# Patient Record
Sex: MALE | NOT HISPANIC OR LATINO | URBAN - METROPOLITAN AREA
[De-identification: names, ages, dates, MRNs, and addresses within clinical notes are randomized per-mention and may not be internally consistent; named-entity substitution may affect disease eponyms.]

---

## 2018-04-26 NOTE — PROCEDURE NOTE: CLINICAL
PROCEDURE NOTE: Dilation of Lacrimal Punctum, With or Without Irrigation #1 Right Lower Lid. Prior to treatment, the risks/benefits/alternatives were discussed. The patient wished to proceed with procedure. The punctum was dilated with a punctum dilator. A 1cc syringe and cannula were used to irrigate saline in to the canaliculus. Patient tolerated procedure well. There were no complications. Post op instructions given. Sabino Tierney

## 2019-01-24 ENCOUNTER — IMPORTED ENCOUNTER (OUTPATIENT)
Dept: URBAN - METROPOLITAN AREA CLINIC 1 | Facility: CLINIC | Age: 64
End: 2019-01-24

## 2019-01-24 PROBLEM — H25.813: Noted: 2019-01-24

## 2019-01-24 PROBLEM — H00.031: Noted: 2019-01-24

## 2019-01-24 PROBLEM — H18.413: Noted: 2019-01-24

## 2019-01-24 PROCEDURE — 92002 INTRM OPH EXAM NEW PATIENT: CPT

## 2019-01-24 NOTE — PATIENT DISCUSSION
1.  Chalazion Abscessed RUL--Patient was compliant with hot compresses without resolution. Discussed option for Incision and Drainage. Risks and Benefits discussed with patient. Patient stated complete understanding and would like to proceed with procedure. PMG examined today with RBF advised to RTC tomorrow morning for I&D. Will hold on refilling antibiotic patient understands. 2.  Cataract OU: Observe for now without intervention. The patient was advised to contact us if any change or worsening of vision3. Tarah Rehman for an appointment in tomorrow morning I&D RUL with Dr. Kurt Painter.

## 2019-01-25 ENCOUNTER — IMPORTED ENCOUNTER (OUTPATIENT)
Dept: URBAN - METROPOLITAN AREA CLINIC 1 | Facility: CLINIC | Age: 64
End: 2019-01-25

## 2019-01-25 PROCEDURE — 67700 BLEPHAROTOMY DRG ABSC EYELID: CPT

## 2019-01-25 NOTE — PATIENT DISCUSSION
Incision and drainage of abscessed chalazion on right upper eyelid: After proper The patient was taken to the operating room and placed supine on the operating table. The right upper eye lid was prepped and draped in the standard surgical fashion. Xylocaine 1% and Epinephrine was infiltrated around the chalaziion. A chalazion speculum was then placed and the eyelid was everted. A cross incision was made through the chalazion and immediate release of the lipogranulomatous material was expressed. A curette was used to further evacuate the chalazion cavity. The speculum was removed Maxitrol ointment was applied no pressure patch applied. Instructed patient to clean the surgical site with soap or peroxide till the area has closed. The patient tolerated the procedure well and there were no complications. F/u PRN.

## 2019-08-20 ENCOUNTER — IMPORTED ENCOUNTER (OUTPATIENT)
Dept: URBAN - METROPOLITAN AREA CLINIC 1 | Facility: CLINIC | Age: 64
End: 2019-08-20

## 2019-08-20 PROBLEM — H10.023: Noted: 2019-08-20

## 2019-08-20 PROBLEM — H00.031: Noted: 2019-08-20

## 2019-08-20 PROCEDURE — 92012 INTRM OPH EXAM EST PATIENT: CPT

## 2019-08-20 NOTE — PATIENT DISCUSSION
1.  Chalazion abscessed right upper eyelid -- Begin Keflex 250 mg PO QID x 1 week #28 (erx'd). Discussed with patient to use Hot compresses TID x 5 minutes. (Stressed temperature of compress to be over 100°) If without improvement discussed with patient possible Incision and Drainage procedure. Discussed Risks and benefits discussed with patient and patient states full understanding. 2. Bacterial Conjunctivitis OU -- Secondary to abscessed Chalazion. Begin Tobradex QID OU x 1 week/until seen (erx'd). The condition was discussed with the patient. The mechanism of transmission was explained. The patient was advised to wash his hands and use separate towels and bedding. Return for an appointment in 1 week for a 10 with Dr. Lucio Recinos.

## 2019-08-20 NOTE — PATIENT DISCUSSION
Bacterial Conjunctivitis OU --The condition was discussed with the patient. Topical antibiotic drops were prescribed. The mechanism of transmission was explained. The patient was advised to wash his hands and use separate towels and bedding.

## 2020-06-24 ENCOUNTER — IMPORTED ENCOUNTER (OUTPATIENT)
Dept: URBAN - METROPOLITAN AREA CLINIC 1 | Facility: CLINIC | Age: 65
End: 2020-06-24

## 2020-06-24 PROBLEM — H00.031: Noted: 2020-06-24

## 2020-06-24 PROBLEM — H11.31: Noted: 2020-06-24

## 2020-06-24 PROCEDURE — 92012 INTRM OPH EXAM EST PATIENT: CPT

## 2020-06-24 NOTE — PATIENT DISCUSSION
1.  Chalazion abscessed right upper eyelid -- Keflex 500mg PO BID x 10 days #20. Begin Hot compresses TID x 5 minutes for 3 weeks. If without improvement discussed with patient possible Incision and Drainage procedure. Risks and benefits discussed with patient and patient states full understanding. 2. Bacterial Conjunctivitis OU -- Secondary to abscessed Chalazion. Begin Tobrex (tobramycin) QID OD x 1 week/until seen (erx'd). The condition was discussed with the patient. The mechanism of transmission was explained. The patient was advised to wash his hands and use separate towels and bedding. 3. Subconjunctival hemorrhage OD -- Reassurance given. Condition discussed with patient. 4.  Cataract OU -- Observe . [facility]. 5.  Carlos Holt for an appointment in 2 weeks for a 10 with Dr. Lauren Matta.

## 2020-07-17 ENCOUNTER — IMPORTED ENCOUNTER (OUTPATIENT)
Dept: URBAN - METROPOLITAN AREA CLINIC 1 | Facility: CLINIC | Age: 65
End: 2020-07-17

## 2020-07-17 PROBLEM — H00.031: Noted: 2020-07-17

## 2020-07-17 PROBLEM — H11.31: Noted: 2020-07-17

## 2020-07-17 PROBLEM — H10.023: Noted: 2020-07-17

## 2020-07-17 PROCEDURE — 99213 OFFICE O/P EST LOW 20 MIN: CPT

## 2020-07-17 NOTE — PATIENT DISCUSSION
1.  Chalazion abscessed right upper eyelid - Resolved cont daily hot compresses 2. Subconjunctival hemorrhage OD -- Resolved 3. Bacterial Conjunctivitis OU -- Resolved DC Tobrex 4. Cataract OU 5. Belinda Levin for an appointment in PRN (patient is followed at the [place])  with Dr. Zay Saucedo.

## 2021-05-26 ENCOUNTER — IMPORTED ENCOUNTER (OUTPATIENT)
Dept: URBAN - METROPOLITAN AREA CLINIC 1 | Facility: CLINIC | Age: 66
End: 2021-05-26

## 2021-05-26 PROBLEM — H01.11: Noted: 2021-05-26

## 2021-05-26 PROBLEM — H04.123: Noted: 2021-05-26

## 2021-05-26 PROBLEM — H10.45: Noted: 2021-05-26

## 2021-05-26 PROBLEM — H16.143: Noted: 2021-05-26

## 2021-05-26 PROCEDURE — 99213 OFFICE O/P EST LOW 20 MIN: CPT

## 2021-05-26 NOTE — PATIENT DISCUSSION
1. Allergic Dermatitis UL - Recommend Cold Compresses TID 2. Allergic Conjunctivitis OS3. Dry Eyes with PEK OU4. Corneal Arcus OU5.  Cataracts OU

## 2021-05-26 NOTE — PATIENT DISCUSSION
Allergic Conjunctivitis OS -- The condition was  discussed with the patient. Avoidance of allergens and cool compresses were recommended.

## 2021-05-26 NOTE — PATIENT DISCUSSION
1. Allergic Dermatitis UL - Recommend Cold Compresses TID. Start Durezol Q2H OS while awake today then QID OS until sample gone (Sample given and instilled x1 in officde today) 2. Allergic Conjunctivitis OS - Recommend OTC Pataday QD OU PRN for itching. 3. Dry Eyes with PEK OU - Recommend ATs TID OU routinely. 4. Corneal Arcus OU5. Cataracts OU - observeReturn for an appointment in Friday 10 with Dr. Héctor Obando.

## 2021-05-28 ENCOUNTER — IMPORTED ENCOUNTER (OUTPATIENT)
Dept: URBAN - METROPOLITAN AREA CLINIC 1 | Facility: CLINIC | Age: 66
End: 2021-05-28

## 2021-05-28 PROBLEM — H00.14: Noted: 2021-05-28

## 2021-05-28 PROBLEM — L23.9: Noted: 2021-05-28

## 2021-05-28 PROCEDURE — 99213 OFFICE O/P EST LOW 20 MIN: CPT

## 2021-05-28 NOTE — PATIENT DISCUSSION
1.  (Possible) SARBJIT Chalazion -- Begin Keflex 500mg PO BID for 10 days (Erx'd). Begin hot compresses TID x 5 minutes. If without improvement discussed with patient possible Incision and Drainage procedure. Risks and benefits discussed with patient and patient states full understanding. 2. Allergic Dermatitis OS (SARBJIT & LLL) -- LL improving. Cont Durezol QID OS until gone. D/c cool compresses & begin hot compresses TID x 5 minutes. 3.  Allergic Conjunctivitis OS -- Cont OTC Pataday QD OU PRN itching. 4.  DESTINY w/ PEK OU -- Cont ATs TID OU routinely. 5.  Cataract OU -- Observe. 6.  Pinguecula OU -- Recommended wearing sunglasses when exposed to UV Light. 7.  Arcus OU Return for an appointment in 1 week 10 with Dr. Jackie Herman.

## 2021-06-07 ENCOUNTER — IMPORTED ENCOUNTER (OUTPATIENT)
Dept: URBAN - METROPOLITAN AREA CLINIC 1 | Facility: CLINIC | Age: 66
End: 2021-06-07

## 2021-06-07 PROBLEM — H00.16: Noted: 2021-06-07

## 2021-06-07 PROBLEM — L23.9: Noted: 2021-06-07

## 2021-06-07 PROBLEM — H00.14: Noted: 2021-06-07

## 2021-06-07 PROCEDURE — 99213 OFFICE O/P EST LOW 20 MIN: CPT

## 2021-06-07 NOTE — PATIENT DISCUSSION
1.  Resolving SARBJIT Chalazion -- Continue Keflex 500mg PO BID until gone. Continue hot compresses TID x 5 minutes x1 more week. If without improvement discussed with patient possible Incision and Drainage procedure. Risks and benefits discussed with patient and patient states full understanding. 2. Allergic Dermatitis OS (SARBJIT & LLL) -- improving. Decrease Durezol to BID OS until gone. 3.  Allergic Conjunctivitis OS -- Cont OTC Pataday QD OU PRN itching. 4.  DESTINY w/ PEK OU -- Cont ATs TID OU routinely. 5.  Cataract OU -- Observe. 6.  Pinguecula OD -- Recommended wearing sunglasses when exposed to UV Light. 7.  Arcus OU Return for an appointment in 6 month 27 with Dr. Ced Walters.

## 2022-04-02 ASSESSMENT — VISUAL ACUITY
OS_SC: 20/25-2
OD_SC: 20/20
OS_SC: 20/25
OD_SC: 20/20
OS_SC: 20/20
OD_SC: 20/30
OD_SC: 20/20
OS_SC: 20/20
OD_SC: 20/40
OS_SC: 20/20-1

## 2022-04-02 ASSESSMENT — TONOMETRY
OD_IOP_MMHG: 17
OS_IOP_MMHG: 17
OS_IOP_MMHG: 17
OD_IOP_MMHG: 16
OD_IOP_MMHG: 16
OS_IOP_MMHG: 17
OS_IOP_MMHG: 18
OD_IOP_MMHG: 17
OS_IOP_MMHG: 16
OD_IOP_MMHG: 18
OD_IOP_MMHG: 19
OD_IOP_MMHG: 17

## 2022-05-16 NOTE — PATIENT DISCUSSION
Recommended Lucentis #1 (1 of 2)  injection today. The injection was given and tolerated well by patient. Post-injection instructions were reviewed and understood by the patient.

## 2022-05-16 NOTE — PATIENT DISCUSSION
An examination that was significantly and separately identifiable from the procedure performed today was also completed for multiple retina dx OU.

## 2022-06-27 NOTE — PROCEDURE NOTE: CLINICAL
PROCEDURE NOTE: Lucentis 0.5 mg (2 of 2) #2 OS. Diagnosis: Neovascular AMD with Active CNV. Anesthesia: Topical. Prep: Betadine Drops and Scrubs. Prior to injection, risks/benefits/alternatives discussed including infection, loss of vision, hemorrhage, cataract, glaucoma, retinal tears or detachment and patient wished to proceed. Informed consent obtained. . Patient was advised the purpose of the treatment was to slow the progression of the disease, and may not improve visual acuity. Betadine prep was performed. Injection site: 3-4 mm from the limbus. Mask worn during procedure. A lid speculum was used. Intravitreal injection of Lucentis 0.5mg/0.05 ml was given. Discarded remaining *. CRA perfusion confirmed. The eye was irrigated with sterile eye rinse solution. The betadine was washed away. Count fingers vision was verified. The patient tolerated the procedure well and there were no complications from the procedure. Post procedure instructions given. Patient given office phone number/answering service number and advised to call immediately should there be an increase in floaters or redness, loss of vision or pain, or should they have any other questions or concerns. Patient was given the standard instruction sheet. Jen Pollen

## 2022-07-18 ENCOUNTER — EMERGENCY VISIT (OUTPATIENT)
Dept: URBAN - METROPOLITAN AREA CLINIC 1 | Facility: CLINIC | Age: 67
End: 2022-07-18

## 2022-07-18 DIAGNOSIS — H00.031: ICD-10-CM

## 2022-07-18 PROCEDURE — 99213 OFFICE O/P EST LOW 20 MIN: CPT

## 2022-07-18 ASSESSMENT — VISUAL ACUITY
OD_CC: 20/20
OS_CC: 20/20

## 2022-07-18 ASSESSMENT — TONOMETRY
OD_IOP_MMHG: 13
OS_IOP_MMHG: 13

## 2022-07-18 NOTE — PATIENT DISCUSSION
Continue Antibiotics given by Urgent Care. Increase Hot compress to 3-4 times daily. Will return Friday Morning for Possible I&D.

## 2022-08-01 NOTE — PROCEDURE NOTE: CLINICAL

## 2022-08-01 NOTE — PATIENT DISCUSSION
DISCUSSED THE DX, IMAGES, PROGNOSIS AND TX PLAN. PT REQUIRES TX, RISK OF VI: MOD/HIGH. ALL QUESTIONS WERE ANSWERED.

## 2022-08-01 NOTE — PATIENT DISCUSSION
8/1/22: STABLE. OBSERVE. An examination that was significantly and separately identifiable from the procedure performed today was also completed for multiple retina dx OU.

## 2022-08-01 NOTE — PATIENT DISCUSSION
8/1/22: slight worsening AT 5 WKS. WE'LL TRY 4 WKS AND REASSESS. POST INJECTION EVALUATION, no signs of new infection, tear, RD, VF, EOM, CNS, Vascular or other problems or side effect from previous injection(s).

## 2022-11-28 NOTE — PATIENT DISCUSSION
Patient understands condition, prognosis and need for follow up care. normal, alert, pleasant, well nourished, in no acute distress, well developed, well nourished, ambulating without difficulty

## 2023-01-31 NOTE — PATIENT DISCUSSION
Recommended artificial tears to use as directed. Medical Necessity Clause: This procedure was medically necessary because the lesions that were treated were:

## 2023-12-15 NOTE — PATIENT DISCUSSION
Continue Maxitrol solution bid for 7 days. Expect a call from Dr. Cui's surgery scheduler Ekaterina Sneed [phone] to arrange your surgery.